# Patient Record
Sex: FEMALE | Race: WHITE
[De-identification: names, ages, dates, MRNs, and addresses within clinical notes are randomized per-mention and may not be internally consistent; named-entity substitution may affect disease eponyms.]

---

## 2018-03-25 ENCOUNTER — HOSPITAL ENCOUNTER (EMERGENCY)
Dept: HOSPITAL 25 - ED | Age: 53
Discharge: HOME | End: 2018-03-25
Payer: COMMERCIAL

## 2018-03-25 VITALS — SYSTOLIC BLOOD PRESSURE: 109 MMHG | DIASTOLIC BLOOD PRESSURE: 70 MMHG

## 2018-03-25 DIAGNOSIS — R50.9: ICD-10-CM

## 2018-03-25 DIAGNOSIS — M54.5: Primary | ICD-10-CM

## 2018-03-25 DIAGNOSIS — R11.0: ICD-10-CM

## 2018-03-25 LAB
BASOPHILS # BLD AUTO: 0 10^3/UL (ref 0–0.2)
EOSINOPHIL # BLD AUTO: 0.1 10^3/UL (ref 0–0.6)
HCT VFR BLD AUTO: 45 % (ref 35–47)
HGB BLD-MCNC: 14.7 G/DL (ref 12–16)
LYMPHOCYTES # BLD AUTO: 1.1 10^3/UL (ref 1–4.8)
MCH RBC QN AUTO: 31 PG (ref 27–31)
MCHC RBC AUTO-ENTMCNC: 33 G/DL (ref 31–36)
MCV RBC AUTO: 94 FL (ref 80–97)
MONOCYTES # BLD AUTO: 0.4 10^3/UL (ref 0–0.8)
NEUTROPHILS # BLD AUTO: 4.3 10^3/UL (ref 1.5–7.7)
NRBC # BLD AUTO: 0 10^3/UL
NRBC BLD QL AUTO: 0
PLATELET # BLD AUTO: 230 10^3/UL (ref 150–450)
RBC # BLD AUTO: 4.75 10^6/UL (ref 4–5.4)
WBC # BLD AUTO: 6 10^3/UL (ref 3.5–10.8)

## 2018-03-25 PROCEDURE — 36415 COLL VENOUS BLD VENIPUNCTURE: CPT

## 2018-03-25 PROCEDURE — 83605 ASSAY OF LACTIC ACID: CPT

## 2018-03-25 PROCEDURE — 76856 US EXAM PELVIC COMPLETE: CPT

## 2018-03-25 PROCEDURE — 83690 ASSAY OF LIPASE: CPT

## 2018-03-25 PROCEDURE — 86140 C-REACTIVE PROTEIN: CPT

## 2018-03-25 PROCEDURE — 85025 COMPLETE CBC W/AUTO DIFF WBC: CPT

## 2018-03-25 PROCEDURE — 74176 CT ABD & PELVIS W/O CONTRAST: CPT

## 2018-03-25 PROCEDURE — 81003 URINALYSIS AUTO W/O SCOPE: CPT

## 2018-03-25 PROCEDURE — 80053 COMPREHEN METABOLIC PANEL: CPT

## 2018-03-25 PROCEDURE — 99282 EMERGENCY DEPT VISIT SF MDM: CPT

## 2018-03-25 PROCEDURE — 87502 INFLUENZA DNA AMP PROBE: CPT

## 2018-03-25 NOTE — RAD
Indication: RIGHT adnexal region and back pain.



Comparison: CT of the same date.



Technique: Transabdominal pelvic ultrasound.



Report: Unremarkable 7.0 x 3.5 x 4.1 cm anteverted uterus with 13 mm endometrium.



Negative for free pelvic fluid. 1.2 x 0.8 x 2.1 cm RIGHT ovary with documented vascular

flow is unremarkable. 1.9 x 1.0 x 2.2 cm LEFT ovary with documented vascular flow is

unremarkable.



No visualized extra ovarian adnexal region lesions evident.



IMPRESSION:  Negative pelvic ultrasound.

## 2018-03-25 NOTE — RAD
INDICATION: Low back pain. Fever. Worsening symptoms today. Previous inguinal hernia

repair.



COMPARISON:  No relevant prior exams available on the [facility] PACS for comparison.



TECHNIQUE: Multidetector CT images were obtained from the lung bases to the ischial

tuberosities. Evaluation of the viscera is limited without IV contrast. Multiplanar

reformation.



REPORT: Minimal atelectasis at the lung bases.



20 cm cephalocaudal liver with variant cephalocaudal elongated RIGHT hepatic lobe. No

focal abnormality of the liver. Decompressed gallbladder limiting assessment without gross

CT abnormality. Unremarkable pancreas and spleen.



Negative for CT abnormality of the upper GI, small bowel, appendix, or colon. Negative for

ascites, free air, hernias.



Normal adrenal glands. Unremarkable unenhanced kidneys. Negative for obstructive uropathy.

Unremarkable ureters and partially distended urinary bladder as well as the anteverted

uterus and adnexal regions.



Negative for lymphadenopathy. Normal diameter anomaly aorta and iliac arteries.

Physiologic distention of the IVC.



Negative for fracture or suspicious osseous lesions. Mild annular disc bulges at L4-L5 and

L5-S1 with only minimal resulting impression on the ventral margin of the thecal sac at

L4-L5. 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

of significant resulting foraminal stenosis.



IMPRESSION: No acute abdominal pelvic pathologic process evident. Normal appendix

documented. Negative for obstructive uropathy.

## 2018-04-10 NOTE — ED
Jose ANTOINE Stephanie, scribed for Tarik Carver MD on 03/25/18 at 1045 .





Back Pain





- HPI Summary


HPI Summary: 


The pt is a 51 y/o F presenting to the ED with c/o lower back pain that began 

on 3/22/18. The pt states her back pain started on the R side of the back and 

traveled to both sides. Symptoms include fever, diaphoresis, chills and nausea. 

The pt denies dysuria, hematuria, urinary frequency, N/V/D, LE tingling/

numbness. 








- History of Current Complaint


Chief Complaint: EDBackInjuryPain


Stated Complaint: BACK PAIN


Time Seen by Provider: 03/25/18 10:26


Hx Obtained From: Patient


Onset/Duration: Sudden Onset, Lasting Hours, Still Present


Timing: Constant


Back Pain Location: Is Discrete @ - lower back


Severity Currently: Severe


Pain Intensity: 8


Pain Scale Used: 0-10 Numeric


Aggravating Symptom(s): Movement


Alleviating Symptom(s): Nothing


Associated Signs And Symptoms: Positive: Fever, Other - diaphoresis, chills, 

nausea..  Negative: Numbness - diaphoresis, chills, nausea. Negative: dysuria, 

hematuria, urinary frequency, N/V/D, LE tingling/numbness., Tingling





- Allergies/Home Medications


Allergies/Adverse Reactions: 


 Allergies











Allergy/AdvReac Type Severity Reaction Status Date / Time


 


No Known Allergies Allergy   Verified 03/25/18 10:33














PMH/Surg Hx/FS Hx/Imm Hx


Endocrine/Hematology History: 


   Denies: Hx Anticoagulant Therapy, Other Endocrine/Hematological Disorders


Cardiovascular History: 


   Denies: Other Cardiovascular Problems/Disorders


Respiratory History: 


   Denies: Other Respiratory Problems/Disorders


GI History: 


   Denies: Other GI Disorders


 History: 


   Denies: Other  Problems/Disorders


Musculoskeletal History: 


   Denies: Other Musculoskeletal History


Sensory History: 


   Denies: Other Sensory Impairments


Opthamlomology History: 


   Denies: Other Sensory Impairments


Neurological History: 


   Denies: Other Neuro Impairments/Disorders


Psychiatric History: 


   Denies: Other Psychiatric Issues/Disorders





- Cancer History


Hx Chemotherapy: No


Hx Radiation Therapy: No





- Surgical History


Surgery Procedure, Year, and Place: Tonsillectomy


Hx Anesthesia Reactions: No


Infectious Disease History: No


Infectious Disease History: 


   Denies: Traveled Outside the [place] in Last 30 Days





- Family History


Known Family History: Positive: Hypertension - paternal, Other -  MS-maternal, 

cancer





- Social History


Occupation: Employed Full-time


Lives: With Family


Alcohol Use: Occasionally


Substance Use Type: Reports: None





Review of Systems


Positive: Fever, Chills, Skin Diaphoresis


Negative: Erythema


Negative: Sore Throat


Negative: Chest Pain


Negative: Shortness Of Breath, Cough


Positive: Nausea.  Negative: Abdominal Pain, Vomiting, Diarrhea


Negative: dysuria, frequency, hematuria


Negative: Myalgia, Edema


Negative: Rash


Neurological: Other - Negative: dizziness


Negative: Paresthesia, Numbness


All Other Systems Reviewed And Are Negative: Yes





Physical Exam





- Summary


Physical Exam Summary: 


Constitutional: Well-developed, Well-nourished, Alert. The pt is in moderate 

pain especially when she tries to move. 


Skin: Warm, Dry


HENT: Normocephalic; Atraumatic


Eyes: Conjunctiva normal


Neck: Musculoskeletal ROM normal neck. (-) JVD, (-) Stridor, (-) Tracheal 

deviation


Cardio: Rhythm regular, rate normal, Heart sounds normal; Intact distal pulses; 

The pedal pulses are 2+ and symmetric. Radial pulses are 2+ and symmetric. (-) 

Murmur


Pulmonary/Chest wall: Effort normal. (-) Respiratory distress, (-) Wheezes, (-) 

Rales


Abd: Soft, mild CVA tenderness bilaterally, (-) Distension, (-) Guarding, (-) 

Rebound


Musculoskeletal: (-) Edema


Lymph: (-) Cervical adenopathy


Neuro: Alert, Oriented x3


Psych: Mood and affect Normal








Triage Information Reviewed: Yes


Vital Signs On Initial Exam: 


 Initial Vitals











Temp Pulse Resp BP Pulse Ox


 


 99.8 F   104   19   111/52   100 


 


 03/25/18 10:20  03/25/18 10:20  03/25/18 10:20  03/25/18 10:20  03/25/18 10:20











Vital Signs Reviewed: Yes





Diagnostics





- Vital Signs


 Vital Signs











  Temp Pulse Resp BP Pulse Ox


 


 03/25/18 10:20  99.8 F  104  19  111/52  100














- Laboratory


Result Diagrams: 


 03/25/18 10:37





 03/25/18 10:37


Lab Statement: Any lab studies that have been ordered have been reviewed, and 

results considered in the medical decision making process.





- CT


  ** Abdomen/Pelvis


CT Interpretation: No Acute Changes


CT Interpretation Completed By: Radiologist -  No acute abdominal pelvic 

pathologic process evident. Normal appendix documented. Negative for 

obstructive uropathy. ED physician has reviewed this report and agrees.





- Additional Comments


Diagnostic Additional Comments: 


Pelvis US reveals: Negative pelvis ultrasound. 








Re-Evaluation





- Re-Evaluation


  ** First Eval


Re-Evaluation Time: 11:50


Change: Improved - Pain is resolved. No abd tenderness other than mild R 

adenexal tenderness. Due to severity of previous pain, ED physician will order 

a CT and US.





  ** Second Eval


Re-Evaluation Time: 13:22


Change: Improved - The pt is ambulatory.





Back Pain Course/Dx





- Course


Course Of Treatment: There are no features of cauda equina syndrome or epidural 

abscess. No neural deficits or sciatica.





- Diagnoses


Provider Diagnoses: 


 Lower back pain








Discharge





- Sign-Out/Discharge


Documenting (check all that apply): Discharge





- Discharge Plan


Condition: Stable


Disposition: HOME


Prescriptions: 


Diazepam TAB(*) [Valium TAB(*)] 5 mg PO TID PRN #10 tab MDD 3


 PRN Reason: Pain Scale 6-10


Lidocaine PATCH 5%* [Lidoderm 5% Patch*] 1 patch TRANSDERM DAILY #10 patch


Patient Education Materials:  Low Back Strain (ED), Acute Low Back Pain (ED)


Forms:  *Work Release


Referrals: 


Manjula Joel MD [Primary Care Provider] - 2 Days


Additional Instructions: 


RETURN TO THE EMERGENCY DEPARTMENT FOR CHANGING OR WORSENING SYMPTOMS. 





The documentation as recorded by the Jose ford Stephanie accurately reflects 

the service I personally performed and the decisions made by me, Tarik Carver MD.